# Patient Record
Sex: FEMALE | Race: BLACK OR AFRICAN AMERICAN | Employment: FULL TIME | URBAN - METROPOLITAN AREA
[De-identification: names, ages, dates, MRNs, and addresses within clinical notes are randomized per-mention and may not be internally consistent; named-entity substitution may affect disease eponyms.]

---

## 2019-10-01 ENCOUNTER — NURSE TRIAGE (OUTPATIENT)
Dept: ADMINISTRATIVE | Facility: CLINIC | Age: 24
End: 2019-10-01

## 2019-10-01 NOTE — TELEPHONE ENCOUNTER
Reason for Disposition   Caller has already spoken with another triager and has no further questions.    Additional Information   Negative: Caller is angry or rude (e.g., hangs up, verbally abusive, yelling)   Negative: Caller hangs up   Negative: Caller has already spoken with the PCP and has no further questions.    Protocols used: NO CONTACT OR DUPLICATE CONTACT CALL-A-AH

## 2019-10-01 NOTE — TELEPHONE ENCOUNTER
"    Reason for Disposition   Earache also present    Additional Information   Negative: Severe difficulty breathing (e.g., struggling for each breath, speaks in single words, stridor)   Negative: Sounds like a life-threatening emergency to the triager   Negative: [1] Diagnosed strep throat AND [2] taking antibiotic AND [3] symptoms continue   Negative: Throat culture results, call about   Negative: Productive cough is main symptom   Negative: Non-productive cough is main symptom   Negative: Hoarseness is main symptom   Negative: Runny nose is main symptom   Negative: [1] Drooling or spitting out saliva (because can't swallow) AND [2] normal breathing   Negative: Unable to open mouth completely   Negative: [1] Difficulty breathing AND [2] not severe   Negative: Fever > 104 F (40 C)   Negative: [1] Refuses to drink anything AND [2] for > 12 hours   Negative: [1] Drinking very little AND [2] dehydration suspected (e.g., no urine > 12 hours, very dry mouth, very lightheaded)   Negative: Patient sounds very sick or weak to the triager   Negative: SEVERE (e.g., excruciating) throat pain   Negative: [1] Pus on tonsils (back of throat) AND [2]  fever AND [3] swollen neck lymph nodes ("glands")   Negative: [1] Rash AND [2] widespread (especially chest and abdomen)    Protocols used: SORE THROAT-A-AH      "

## 2019-10-02 ENCOUNTER — OFFICE VISIT (OUTPATIENT)
Dept: INTERNAL MEDICINE | Facility: CLINIC | Age: 24
End: 2019-10-02
Payer: COMMERCIAL

## 2019-10-02 VITALS
TEMPERATURE: 100 F | HEIGHT: 60 IN | SYSTOLIC BLOOD PRESSURE: 106 MMHG | HEART RATE: 102 BPM | DIASTOLIC BLOOD PRESSURE: 70 MMHG | OXYGEN SATURATION: 99 % | BODY MASS INDEX: 24.11 KG/M2 | WEIGHT: 122.81 LBS

## 2019-10-02 DIAGNOSIS — J35.8 TONSIL STONE: ICD-10-CM

## 2019-10-02 DIAGNOSIS — J06.9 URTI (ACUTE UPPER RESPIRATORY INFECTION): Primary | ICD-10-CM

## 2019-10-02 DIAGNOSIS — D50.9 IRON DEFICIENCY ANEMIA, UNSPECIFIED IRON DEFICIENCY ANEMIA TYPE: ICD-10-CM

## 2019-10-02 DIAGNOSIS — L30.9 ECZEMA, UNSPECIFIED TYPE: ICD-10-CM

## 2019-10-02 PROCEDURE — 99999 PR PBB SHADOW E&M-EST. PATIENT-LVL V: CPT | Mod: PBBFAC,,, | Performed by: STUDENT IN AN ORGANIZED HEALTH CARE EDUCATION/TRAINING PROGRAM

## 2019-10-02 PROCEDURE — 99213 OFFICE O/P EST LOW 20 MIN: CPT | Mod: S$GLB,,, | Performed by: STUDENT IN AN ORGANIZED HEALTH CARE EDUCATION/TRAINING PROGRAM

## 2019-10-02 PROCEDURE — 3008F PR BODY MASS INDEX (BMI) DOCUMENTED: ICD-10-PCS | Mod: CPTII,S$GLB,, | Performed by: STUDENT IN AN ORGANIZED HEALTH CARE EDUCATION/TRAINING PROGRAM

## 2019-10-02 PROCEDURE — 3008F BODY MASS INDEX DOCD: CPT | Mod: CPTII,S$GLB,, | Performed by: STUDENT IN AN ORGANIZED HEALTH CARE EDUCATION/TRAINING PROGRAM

## 2019-10-02 PROCEDURE — 99999 PR PBB SHADOW E&M-EST. PATIENT-LVL V: ICD-10-PCS | Mod: PBBFAC,,, | Performed by: STUDENT IN AN ORGANIZED HEALTH CARE EDUCATION/TRAINING PROGRAM

## 2019-10-02 PROCEDURE — 99213 PR OFFICE/OUTPT VISIT, EST, LEVL III, 20-29 MIN: ICD-10-PCS | Mod: S$GLB,,, | Performed by: STUDENT IN AN ORGANIZED HEALTH CARE EDUCATION/TRAINING PROGRAM

## 2019-10-02 RX ORDER — FLUTICASONE PROPIONATE 50 MCG
1 SPRAY, SUSPENSION (ML) NASAL DAILY
Qty: 16 G | Refills: 0 | Status: SHIPPED | OUTPATIENT
Start: 2019-10-02

## 2019-10-02 RX ORDER — CETIRIZINE HYDROCHLORIDE 10 MG/1
10 TABLET ORAL DAILY
Refills: 0 | COMMUNITY
Start: 2019-10-02 | End: 2020-10-01

## 2019-10-02 NOTE — PATIENT INSTRUCTIONS
For your symptoms (sinusitis, congestion, cough):    1. Saline nasal spray (Oceans) or nasal rinse (NeilMed) at least 2-3 times/day    2. Flonase (fluticasone) or other steroid nasal spray - twice a day for 1 week, then once a day thereafter    3. Claritin (loratadine), Zyrtec (cetirizine), or Allegra (fexofenadine) once a day    4. Mucinex (guaifenesin) every 8-12 hours with plenty of water. If you are having a cough, you can use Mucinex-DM (guaifenesin-dextromethorphan).     5. Hot soup, hot tea with honey and lemon.    6. Plenty of sleep!      Viral Upper Respiratory Illness (Adult)  You have a viral upper respiratory illness (URI), which is another term for the common cold. This illness is contagious during the first few days. It is spread through the air by coughing and sneezing. It may also be spread by direct contact (touching the sick person and then touching your own eyes, nose, or mouth). Frequent handwashing will decrease risk of spread. Most viral illnesses go away within 7 to 10 days with rest and simple home remedies. Sometimes the illness may last for several weeks. Antibiotics will not kill a virus, and they are generally not prescribed for this condition.    Home care  · If symptoms are severe, rest at home for the first 2 to 3 days. When you resume activity, don't let yourself get too tired.  · Avoid being exposed to cigarette smoke (yours or others).  · You may use acetaminophen or ibuprofen to control pain and fever, unless another medicine was prescribed. (Note: If you have chronic liver or kidney disease, have ever had a stomach ulcer or gastrointestinal bleeding, or are taking blood-thinning medicines, talk with your healthcare provider before using these medicines.) Aspirin should never be given to anyone under 18 years of age who is ill with a viral infection or fever. It may cause severe liver or brain damage.  · Your appetite may be poor, so a light diet is fine. Avoid dehydration by  drinking 6 to 8 glasses of fluids per day (water, soft drinks, juices, tea, or soup). Extra fluids will help loosen secretions in the nose and lungs.  · Over-the-counter cold medicines will not shorten the length of time youre sick, but they may be helpful for the following symptoms: cough, sore throat, and nasal and sinus congestion. (Note: Do not use decongestants if you have high blood pressure.)  Follow-up care  Follow up with your healthcare provider, or as advised.  When to seek medical advice  Call your healthcare provider right away if any of these occur:  · Cough with lots of colored sputum (mucus)  · Severe headache; face, neck, or ear pain  · Difficulty swallowing due to throat pain  · Fever of 100.4°F (38°C)  Call 911, or get immediate medical care  Call emergency services right away if any of these occur:  · Chest pain, shortness of breath, wheezing, or difficulty breathing  · Coughing up blood  · Inability to swallow due to throat pain  Date Last Reviewed: 9/13/2015  © 6154-8916 [employer]. [address] [place] [address], [place] [address]. All rights reserved. This information is not intended as a substitute for professional medical care. Always follow your healthcare professional's instructions.

## 2019-10-02 NOTE — PROGRESS NOTES
Subjective:       Patient ID: Missy Martinez is a 24 y.o. female.    Chief Complaint: Establish Care; Sore Throat; and Headache    HPI     This is a 25 y/o female with hx of HEATH, tonsillar stones and eczema presents to urgent care for sore throat and cough for 3 days.    She reports hx of tonsillar stones that been evaluated by ENT at [facility] where they advised surgery but she declined at that time due to school commitment. She gets them very often every months or so and usually comes out or as she said pop it out.    Last 09/30 she got out few stones from her tonsils- after that been feeling subjective fever, sore throat, sinus and ear pressure- post nasal drip and dry cough.     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    She also complained of thinned hair in one spot on her scalp- but no loss (on exam shows her thinned her but no alopecia).    Review of Systems   Constitutional: Positive for activity change. Negative for appetite change, chills and fever.   HENT: Positive for postnasal drip, sinus pressure and sore throat. Negative for congestion and rhinorrhea.    Eyes: Negative for pain and itching.   Respiratory: Positive for cough (dry). Negative for chest tightness and shortness of breath.    Cardiovascular: Negative for palpitations and leg swelling.   Gastrointestinal: Negative for abdominal pain, diarrhea and nausea.   Endocrine: Negative for polyphagia and polyuria.   Genitourinary: Negative for dysuria and frequency.   Musculoskeletal: Negative for back pain.   Neurological: Negative for numbness and headaches.   Psychiatric/Behavioral: Negative for agitation and behavioral problems.       Objective:       Vitals:    10/02/19 1426   BP: 106/70   BP Location: Left arm   Patient Position: Sitting   BP Method: Medium (Manual)   Pulse: 102   Temp: 99.5 °F (37.5 °C)   SpO2: 99%    Weight: 55.7 kg (122 lb 12.7 oz)   Height: 5' (1.524 m)     Physical Exam   Constitutional: She is oriented to person, place, and time. She appears well-developed.   HENT:   Head: Normocephalic.   Mouth/Throat: Posterior oropharyngeal erythema present.   Cardiovascular: Normal rate, regular rhythm and normal heart sounds.   No murmur heard.  Pulmonary/Chest: Effort normal and breath sounds normal.   Abdominal: Soft. Bowel sounds are normal. She exhibits no distension. There is no tenderness.   Musculoskeletal: Normal range of motion.   Neurological: She is alert and oriented to person, place, and time.   Psychiatric: She has a normal mood and affect. Her behavior is normal.       Assessment:       1. URTI (acute upper respiratory infection)    2. Iron deficiency anemia, unspecified iron deficiency anemia type    3. Eczema, unspecified type    4. Tonsil stone    5. Hair loss        Plan:         URTI:    1. Saline nasal spray ([facility]ceans) or nasal rinse (NeilMed) at least 2-3 times/day  2. Flonase (fluticasone) or other steroid nasal spray - twice a day for 1 week, then once a day thereafter  3. Zyrtec (cetirizine) once a day  4. Mucinex (guaifenesin) every 8-12 hours with plenty of water.    - Advised if symptoms worsened or did not improve by 10 days needs to seek medical care.      - HEATH- will get a CBC.    - Tonsillar stones- will refer back to ENT as she is able to do the surgery now.          Haresh Green MD  Internal Medicine  PGY-3

## 2019-10-02 NOTE — LETTER
October 2, 2019      [facility]  [address]  [facility] [address]  Phone: [phone]  Fax: [phone]       Patient: Missy Martinez   YOB: 1995  Date of Visit: 10/02/2019    To Whom It May Concern:    Fan Martinez  was at [facility] on 10/02/2019. She may return to work/school on 10/07/2019 with no restrictions. If you have any questions or concerns, or if I can be of further assistance, please do not hesitate to contact me.    Sincerely,    Haresh Green MD

## 2019-10-04 NOTE — PROGRESS NOTES
I have reviewed the notes, assessments, and/or procedures performed by Dr. Green, I concur with her documentation of Missy Martinez.

## 2019-10-06 ENCOUNTER — OFFICE VISIT (OUTPATIENT)
Dept: URGENT CARE | Facility: CLINIC | Age: 24
End: 2019-10-06
Payer: COMMERCIAL

## 2019-10-06 VITALS
OXYGEN SATURATION: 98 % | BODY MASS INDEX: 23.95 KG/M2 | DIASTOLIC BLOOD PRESSURE: 84 MMHG | HEART RATE: 92 BPM | HEIGHT: 60 IN | WEIGHT: 122 LBS | RESPIRATION RATE: 18 BRPM | SYSTOLIC BLOOD PRESSURE: 118 MMHG | TEMPERATURE: 98 F

## 2019-10-06 DIAGNOSIS — J02.9 SORE THROAT: ICD-10-CM

## 2019-10-06 DIAGNOSIS — J03.80 ACUTE BACTERIAL TONSILLITIS: Primary | ICD-10-CM

## 2019-10-06 DIAGNOSIS — B96.89 ACUTE BACTERIAL TONSILLITIS: Primary | ICD-10-CM

## 2019-10-06 LAB
CTP QC/QA: YES
S PYO RRNA THROAT QL PROBE: NEGATIVE

## 2019-10-06 PROCEDURE — 87880 STREP A ASSAY W/OPTIC: CPT | Mod: QW,S$GLB,, | Performed by: FAMILY MEDICINE

## 2019-10-06 PROCEDURE — 99214 OFFICE O/P EST MOD 30 MIN: CPT | Mod: S$GLB,,, | Performed by: FAMILY MEDICINE

## 2019-10-06 PROCEDURE — 3008F PR BODY MASS INDEX (BMI) DOCUMENTED: ICD-10-PCS | Mod: CPTII,S$GLB,, | Performed by: FAMILY MEDICINE

## 2019-10-06 PROCEDURE — 3008F BODY MASS INDEX DOCD: CPT | Mod: CPTII,S$GLB,, | Performed by: FAMILY MEDICINE

## 2019-10-06 PROCEDURE — 99214 PR OFFICE/OUTPT VISIT, EST, LEVL IV, 30-39 MIN: ICD-10-PCS | Mod: S$GLB,,, | Performed by: FAMILY MEDICINE

## 2019-10-06 PROCEDURE — 87880 POCT RAPID STREP A: ICD-10-PCS | Mod: QW,S$GLB,, | Performed by: FAMILY MEDICINE

## 2019-10-06 RX ORDER — AMOXICILLIN 875 MG/1
875 TABLET, FILM COATED ORAL EVERY 12 HOURS
Qty: 20 TABLET | Refills: 0 | Status: SHIPPED | OUTPATIENT
Start: 2019-10-06 | End: 2020-03-06

## 2019-10-06 RX ORDER — METHYLPREDNISOLONE 4 MG/1
TABLET ORAL
Qty: 1 PACKAGE | Refills: 0 | Status: SHIPPED | OUTPATIENT
Start: 2019-10-06 | End: 2020-03-06

## 2019-10-06 NOTE — PROGRESS NOTES
Subjective:       Patient ID: Missy Martinez is a 24 y.o. female.    Vitals:  height is 5' (1.524 m) and weight is 55.3 kg (122 lb). Her temperature is 97.7 °F (36.5 °C). Her blood pressure is 118/84 and her pulse is 92. Her respiration is 18 and oxygen saturation is 98%.     Chief Complaint: Sore Throat    24 yr old pleasant female with anemia, and no other significant medical history presents to urgent care c/o difficulty swallowing, sore throat. Pt states she been feeling ill since Tuesday. Pt was seen by an [facility] provider Wednesday and had a URI, and was given flonase and zyrtec. Pt states she feel like her throat is getting worst since then. No fever but feels warm.    Sore Throat    This is a new problem. The current episode started in the past 7 days (Tuesday ). The problem has been gradually worsening. The pain is worse on the right side. There has been no fever. Associated symptoms include coughing. Pertinent negatives include no congestion, ear pain, shortness of breath, stridor or vomiting. She has tried NSAIDs (Zyrtec and flonase ) for the symptoms. The treatment provided no relief.       Constitution: Negative for chills, sweating, fatigue and fever.   HENT: Positive for sinus pressure and sore throat. Negative for ear pain, congestion, sinus pain and voice change.    Neck: Negative for painful lymph nodes.   Eyes: Negative for eye redness.   Respiratory: Positive for cough and sputum production. Negative for chest tightness, bloody sputum, COPD, shortness of breath, stridor, wheezing and asthma.    Gastrointestinal: Negative for nausea and vomiting.   Musculoskeletal: Negative for muscle ache.   Skin: Negative for rash and erythema.   Allergic/Immunologic: Negative for seasonal allergies and asthma.   Hematologic/Lymphatic: Negative for swollen lymph nodes.       PMH/PSH/FH/SH/MED/ALLERGY reviewed    Objective:       Vitals:    10/06/19 1704   BP: 118/84   Pulse: 92   Resp: 18   Temp: 97.7 °F (36.5  °C)       Physical Exam   Constitutional: She is oriented to person, place, and time. She appears well-developed and well-nourished. No distress.   HENT:   Head: Normocephalic and atraumatic.   Right Ear: External ear normal.   Left Ear: External ear normal.   Nose: Nose normal.   Mouth/Throat: Oropharyngeal exudate, posterior oropharyngeal edema, posterior oropharyngeal erythema and tonsillar abscesses present.       Eyes: Pupils are equal, round, and reactive to light. Conjunctivae and EOM are normal. Right eye exhibits no discharge. Left eye exhibits no discharge. No scleral icterus.   Neck: Normal range of motion. Neck supple. No JVD present. No tracheal deviation present. No thyromegaly present.   Cardiovascular: Normal rate, regular rhythm, normal heart sounds and intact distal pulses. Exam reveals no gallop and no friction rub.   No murmur heard.  Pulmonary/Chest: Effort normal and breath sounds normal. No stridor. She has no wheezes. She has no rales. She exhibits no tenderness.   Abdominal: Soft. Bowel sounds are normal. She exhibits no distension and no mass. There is no tenderness. There is no rebound and no guarding. No hernia.   Musculoskeletal: Normal range of motion. She exhibits no edema or tenderness.   Lymphadenopathy:     She has no cervical adenopathy.   Neurological: She is alert and oriented to person, place, and time. She has normal reflexes. She displays normal reflexes. No cranial nerve deficit. She exhibits normal muscle tone. Coordination normal.   Skin: Skin is warm and dry. No rash noted. She is not diaphoretic. No erythema. No pallor.   Psychiatric: She has a normal mood and affect. Her behavior is normal. Judgment and thought content normal.       Assessment:       1. Acute bacterial tonsillitis    2. Sore throat        Plan:         Acute bacterial tonsillitis  -     methylPREDNISolone (MEDROL DOSEPACK) 4 mg tablet; use as directed  Dispense: 1 Package; Refill: 0  -     amoxicillin  (AMOXIL) 875 MG tablet; Take 1 tablet (875 mg total) by mouth every 12 (twelve) hours.  Dispense: 20 tablet; Refill: 0    Sore throat  -     POCT rapid strep A  -     methylPREDNISolone (MEDROL DOSEPACK) 4 mg tablet; use as directed  Dispense: 1 Package; Refill: 0  -     amoxicillin (AMOXIL) 875 MG tablet; Take 1 tablet (875 mg total) by mouth every 12 (twelve) hours.  Dispense: 20 tablet; Refill: 0       acute ryan tonsillitis  -Advised warm liquids, warm/salt water gargles, halls lozenges, avoid cold drinks/bonnie/ice cream  -rapid strep negative  -medrol dose pack and amoxil    Spent adequate time in obtaining history and explaining differentials    Follow up if symptoms worsen or fail to improve.

## 2019-10-06 NOTE — PATIENT INSTRUCTIONS

## 2019-10-24 ENCOUNTER — OFFICE VISIT (OUTPATIENT)
Dept: OTOLARYNGOLOGY | Facility: CLINIC | Age: 24
End: 2019-10-24
Payer: COMMERCIAL

## 2019-10-24 VITALS
HEART RATE: 71 BPM | SYSTOLIC BLOOD PRESSURE: 111 MMHG | BODY MASS INDEX: 23.77 KG/M2 | DIASTOLIC BLOOD PRESSURE: 76 MMHG | WEIGHT: 121.69 LBS

## 2019-10-24 DIAGNOSIS — J03.91 RECURRENT TONSILLITIS: Primary | ICD-10-CM

## 2019-10-24 PROCEDURE — 99202 OFFICE O/P NEW SF 15 MIN: CPT | Mod: S$GLB,,, | Performed by: OTOLARYNGOLOGY

## 2019-10-24 PROCEDURE — 99999 PR PBB SHADOW E&M-EST. PATIENT-LVL III: ICD-10-PCS | Mod: PBBFAC,,, | Performed by: OTOLARYNGOLOGY

## 2019-10-24 PROCEDURE — 3008F BODY MASS INDEX DOCD: CPT | Mod: CPTII,S$GLB,, | Performed by: OTOLARYNGOLOGY

## 2019-10-24 PROCEDURE — 99999 PR PBB SHADOW E&M-EST. PATIENT-LVL III: CPT | Mod: PBBFAC,,, | Performed by: OTOLARYNGOLOGY

## 2019-10-24 PROCEDURE — 3008F PR BODY MASS INDEX (BMI) DOCUMENTED: ICD-10-PCS | Mod: CPTII,S$GLB,, | Performed by: OTOLARYNGOLOGY

## 2019-10-24 PROCEDURE — 99202 PR OFFICE/OUTPT VISIT, NEW, LEVL II, 15-29 MIN: ICD-10-PCS | Mod: S$GLB,,, | Performed by: OTOLARYNGOLOGY

## 2019-10-24 NOTE — ASSESSMENT & PLAN NOTE
This patient has classic recurrent tonsillitis and meets numeric criteria for tonsillectomy.  We discussed that the patient has 2 fundamental options: Observation with continued and intermittent antibiotics versus tonsillectomy.  I described tonsillectomy as an outpatient procedure in which the tonsils are removed.  The patient would be treated with 2 weeks of antibiotics and pain medication.  I then discussed the risks, benefits, indications, and alternatives to tonsillectomy.  The risks were described to include, but not be limited to, infection, bleeding, scarring, failure to eliminate sore throats, and the need for additional procedures.  There is data that suggests that sore throats may occur less frequently or be less severe, although no warranties or guarantees were made or implied.  There is a risk of bleeding within the first 6 hours of surgery which returns between 5 and 14 days after surgery when the scabs resolve.  The patient was encouraged to drink lots of liquids, eat soft foods, and avoid sharp edged foods such as chips.  Time was allowed for questions, and all questions were answered to the patient's apparent satisfaction.      She would like to think about the options and will contact me for additional counseling or to schedule surgery.

## 2019-10-24 NOTE — PROGRESS NOTES
Chief Complaint   Patient presents with    recurrent tonsil issues       HPI   24 y.o. female presents for evaluation of recurrent tonsillitis.  She reports roughly 8 episodes in the last 2 years.  She reports she has missed work due to this problem.  Each episode has been addressed with antibiotics and steroids.    Review of Systems   Constitutional: Negative for fatigue and unexpected weight change.   HENT: Per HPI.  Eyes: Negative for visual disturbance.   Respiratory: Negative for shortness of breath, hemoptysis   Cardiovascular: Negative for chest pain and palpitations.   Musculoskeletal: Negative for decreased ROM, back pain.   Skin: Negative for rash, sunburn, itching.   Neurological: Negative for dizziness and seizures.   Hematological: Negative for adenopathy. Does not bruise/bleed easily.   Endocrine: Negative for rapid weight loss/weight gain, heat/cold intolerance.     Past Medical History   Patient Active Problem List   Diagnosis    Iron deficiency anemia    Eczema           Past Surgical History   Past Surgical History:   Procedure Laterality Date    APPENDECTOMY           Family History   Family History   Problem Relation Age of Onset    Diabetes Mother     Diabetes Father     Asthma Sister     Scoliosis Sister            Social History   .  Social History     Socioeconomic History    Marital status: Single     Spouse name: Not on file    Number of children: Not on file    Years of education: Not on file    Highest education level: Not on file   Occupational History    Not on file   Social Needs    Financial resource strain: Not on file    Food insecurity:     Worry: Not on file     Inability: Not on file    Transportation needs:     Medical: Not on file     Non-medical: Not on file   Tobacco Use    Smoking status: Never Smoker   Substance and Sexual Activity    Alcohol use: Not on file    Drug use: Not on file    Sexual activity: Not on file   Lifestyle    Physical activity:      Days per week: Not on file     Minutes per session: Not on file    Stress: Not on file   Relationships    Social connections:     Talks on phone: Not on file     Gets together: Not on file     Attends Voodoo service: Not on file     Active member of club or organization: Not on file     Attends meetings of clubs or organizations: Not on file     Relationship status: Not on file   Other Topics Concern    Not on file   Social History Narrative    Not on file         Allergies   Review of patient's allergies indicates:  No Known Allergies        Physical Exam     Vitals:    10/24/19 0921   BP: 111/76   Pulse: 71         Body mass index is 23.77 kg/m².      General: AOx3, NAD   Respiratory:  Symmetric chest rise, normal effort  Nose: No gross nasal septal deviation. Inferior Turbinates WNL bilaterally. No septal perforation. No masses/lesions.   Oral Cavity:  Oral Tongue mobile, no lesions noted. Hard Palate WNL. No buccal or FOM lesions.  Oropharynx:  No masses/lesions of the posterior pharyngeal wall. Tonsillar fossa without lesions. Soft palate without masses. Midline uvula.   Neck: No scars.  No cervical lymphadenopathy, thyromegaly or thyroid nodules.  Normal range of motion.    Face: House Brackmann I bilaterally.     Assessment/Plan  Problem List Items Addressed This Visit        ENT    Recurrent tonsillitis - Primary     This patient has classic recurrent tonsillitis and meets numeric criteria for tonsillectomy.  We discussed that the patient has 2 fundamental options: Observation with continued and intermittent antibiotics versus tonsillectomy.  I described tonsillectomy as an outpatient procedure in which the tonsils are removed.  The patient would be treated with 2 weeks of antibiotics and pain medication.  I then discussed the risks, benefits, indications, and alternatives to tonsillectomy.  The risks were described to include, but not be limited to, infection, bleeding, scarring, failure to eliminate  sore throats, and the need for additional procedures.  There is data that suggests that sore throats may occur less frequently or be less severe, although no warranties or guarantees were made or implied.  There is a risk of bleeding within the first 6 hours of surgery which returns between 5 and 14 days after surgery when the scabs resolve.  The patient was encouraged to drink lots of liquids, eat soft foods, and avoid sharp edged foods such as chips.  Time was allowed for questions, and all questions were answered to the patient's apparent satisfaction.      She would like to think about the options and will contact me for additional counseling or to schedule surgery.

## 2019-10-24 NOTE — LETTER
October 24, 2019      Haresh Green MD  [address]  [facility] [address]           [facility]  [address]  [facility] [address]  Phone: [phone]  Fax: [phone]          Patient: Missy Martinez   MR Number: [id number]   YOB: 1995   Date of Visit: 10/24/2019       Dear Dr. Haresh Green:    Thank you for referring Missy Martinez to me for evaluation. Attached you will find relevant portions of my assessment and plan of care.    If you have questions, please do not hesitate to call me. I look forward to following Missy Martinez along with you.    Sincerely,    Kalpesh Ordaz MD    Enclosure  CC:  No Recipients    If you would like to receive this communication electronically, please contact [email or web address] or [phone] to request more information on [employer] access.    For providers and/or their staff who would like to refer a patient to [facility], please contact us through our one-stop-shop provider referral line, [facility], at [phone].    If you feel you have received this communication in error or would no longer like to receive these types of communications, please e-mail [email or web address]

## 2020-03-06 ENCOUNTER — OFFICE VISIT (OUTPATIENT)
Dept: URGENT CARE | Facility: CLINIC | Age: 25
End: 2020-03-06
Payer: COMMERCIAL

## 2020-03-06 VITALS
HEART RATE: 82 BPM | RESPIRATION RATE: 18 BRPM | BODY MASS INDEX: 23.75 KG/M2 | OXYGEN SATURATION: 98 % | DIASTOLIC BLOOD PRESSURE: 73 MMHG | TEMPERATURE: 99 F | SYSTOLIC BLOOD PRESSURE: 114 MMHG | WEIGHT: 121 LBS | HEIGHT: 60 IN

## 2020-03-06 DIAGNOSIS — J03.80 ACUTE BACTERIAL TONSILLITIS: Primary | ICD-10-CM

## 2020-03-06 DIAGNOSIS — J02.9 SORE THROAT: ICD-10-CM

## 2020-03-06 DIAGNOSIS — B96.89 ACUTE BACTERIAL TONSILLITIS: Primary | ICD-10-CM

## 2020-03-06 LAB
CTP QC/QA: YES
CTP QC/QA: YES
FLUAV AG NPH QL: NEGATIVE
FLUBV AG NPH QL: NEGATIVE
MOLECULAR STREP A: NEGATIVE

## 2020-03-06 PROCEDURE — 87651 STREP A DNA AMP PROBE: CPT | Mod: QW,S$GLB,, | Performed by: PHYSICIAN ASSISTANT

## 2020-03-06 PROCEDURE — 99214 PR OFFICE/OUTPT VISIT, EST, LEVL IV, 30-39 MIN: ICD-10-PCS | Mod: 25,S$GLB,, | Performed by: PHYSICIAN ASSISTANT

## 2020-03-06 PROCEDURE — 87804 INFLUENZA ASSAY W/OPTIC: CPT | Mod: QW,S$GLB,, | Performed by: PHYSICIAN ASSISTANT

## 2020-03-06 PROCEDURE — 87804 POCT INFLUENZA A/B: ICD-10-PCS | Mod: QW,S$GLB,, | Performed by: PHYSICIAN ASSISTANT

## 2020-03-06 PROCEDURE — 87651 POCT STREP A MOLECULAR: ICD-10-PCS | Mod: QW,S$GLB,, | Performed by: PHYSICIAN ASSISTANT

## 2020-03-06 PROCEDURE — 99214 OFFICE O/P EST MOD 30 MIN: CPT | Mod: 25,S$GLB,, | Performed by: PHYSICIAN ASSISTANT

## 2020-03-06 RX ORDER — PREDNISONE 10 MG/1
10 TABLET ORAL DAILY
Qty: 4 TABLET | Refills: 0 | Status: SHIPPED | OUTPATIENT
Start: 2020-03-06 | End: 2020-03-10

## 2020-03-06 RX ORDER — AMOXICILLIN 500 MG/1
500 TABLET, FILM COATED ORAL EVERY 12 HOURS
Qty: 20 TABLET | Refills: 0 | Status: SHIPPED | OUTPATIENT
Start: 2020-03-06 | End: 2020-03-16

## 2020-03-07 NOTE — PROGRESS NOTES
Subjective:       Patient ID: Missy Martinez is a 24 y.o. female.    Vitals:  height is 5' (1.524 m) and weight is 54.9 kg (121 lb). Her temperature is 98.5 °F (36.9 °C). Her blood pressure is 114/73 and her pulse is 82. Her respiration is 18 and oxygen saturation is 98%.     Chief Complaint: Sore Throat    C/o sore throat onset yesterday with exudate. Denies cough or congestion or fever. Hx of recurrent bacterial tonsillitis. Requesting strep swab, requesting flu swab, requesting throat culture.    Sore Throat    This is a new problem. The current episode started yesterday. The problem has been unchanged. The pain is worse on the right side. There has been no fever. The pain is mild. Pertinent negatives include no congestion, coughing, ear pain, shortness of breath, stridor or vomiting. She has tried nothing for the symptoms.       Constitution: Negative for chills, sweating, fatigue and fever.   HENT: Positive for sore throat. Negative for ear pain, congestion, sinus pain, sinus pressure and voice change.    Neck: Negative for painful lymph nodes.   Eyes: Negative for eye redness.   Respiratory: Negative for chest tightness, cough, sputum production, bloody sputum, COPD, shortness of breath, stridor, wheezing and asthma.    Gastrointestinal: Negative for nausea and vomiting.   Musculoskeletal: Negative for muscle ache.   Skin: Negative for rash.   Allergic/Immunologic: Negative for seasonal allergies and asthma.   Hematologic/Lymphatic: Negative for swollen lymph nodes.       Objective:      Physical Exam   Constitutional: She is oriented to person, place, and time. She appears well-developed and well-nourished. She is cooperative.  Non-toxic appearance. She does not have a sickly appearance. She does not appear ill. No distress.   HENT:   Head: Normocephalic and atraumatic.   Right Ear: Hearing, tympanic membrane, external ear and ear canal normal.   Left Ear: Hearing, tympanic membrane, external ear and ear  canal normal.   Nose: Nose normal. No mucosal edema, rhinorrhea or nasal deformity. No epistaxis. Right sinus exhibits no maxillary sinus tenderness and no frontal sinus tenderness. Left sinus exhibits no maxillary sinus tenderness and no frontal sinus tenderness.   Mouth/Throat: Uvula is midline and mucous membranes are normal. No trismus in the jaw. Normal dentition. No uvula swelling. Posterior oropharyngeal erythema (mild) present. No oropharyngeal exudate, posterior oropharyngeal edema or tonsillar abscesses. Tonsils are 2+ on the right. Tonsils are 2+ on the left. Tonsillar exudate.   Eyes: Conjunctivae and lids are normal. No scleral icterus.   Neck: Trachea normal, full passive range of motion without pain and phonation normal. Neck supple. No neck rigidity. No edema and no erythema present.   Cardiovascular: Normal rate, regular rhythm, normal heart sounds, intact distal pulses and normal pulses.   Pulmonary/Chest: Effort normal and breath sounds normal. No respiratory distress.   Abdominal: Normal appearance.   Musculoskeletal: Normal range of motion. She exhibits no edema or deformity.   Lymphadenopathy:     She has cervical adenopathy.        Right cervical: Superficial cervical adenopathy present.        Left cervical: Superficial cervical adenopathy present.   Neurological: She is alert and oriented to person, place, and time. She exhibits normal muscle tone. Coordination normal.   Skin: Skin is warm, dry, intact, not diaphoretic and not pale.   Psychiatric: She has a normal mood and affect. Her speech is normal and behavior is normal. Judgment and thought content normal. Cognition and memory are normal.   Nursing note and vitals reviewed.    Results for orders placed or performed in visit on 03/06/20   POCT Strep A, Molecular   Result Value Ref Range    Molecular Strep A, POC Negative Negative     Acceptable Yes    POCT Influenza A/B   Result Value Ref Range    Rapid Influenza A Ag  Negative Negative    Rapid Influenza B Ag Negative Negative     Acceptable Yes            Assessment:       1. Acute bacterial tonsillitis    2. Sore throat        Plan:       Discussed viral etiology is possible. However, hx of recurrent bacterial tonsillitis.  Pt requesting throat culture. Will tx for strep    Labs reviewed, pertinent imaging reviewed, previous medical records, medical history, surgical history, social history, family history reviewed.      Acute bacterial tonsillitis  -     amoxicillin (AMOXIL) 500 MG Tab; Take 1 tablet (500 mg total) by mouth every 12 (twelve) hours. for 10 days  Dispense: 20 tablet; Refill: 0  -     predniSONE (DELTASONE) 10 MG tablet; Take 1 tablet (10 mg total) by mouth once daily. for 4 days  Dispense: 4 tablet; Refill: 0    Sore throat  -     POCT Strep A, Molecular  -     Culture, Throat  -     POCT Influenza A/B    I have explained all risks, benefits, side effects of receiving a steroid - including but not limited to jittery feeling, flushed feeling, heart rate increase, dimpling at site of injection, lowered immune system. Patient understands all associated risks and would like to still receive steroid.        Patient Instructions   Fluids  Rest  Please monitor fevers closely, alternate Motrin and Tylenol as needed for fevers  Increase fluids and rest  If unable to tolerate fluids or foods please go to the emergency department  Change toothbrush fafter 24 hours  Follow up with ent 1 wk    Please drink plenty of fluids.  Please get plenty of rest.  Please return here or go to the Emergency Department for any concerns or worsening of condition.  If you were given wait & see antibiotics, please wait 3-5 days before taking them, and only take them if your symptoms have worsened or not improved.  If you do begin taking the antibiotics, please take them to completion.  If you were prescribed antibiotics, please take them to completion.  If you were prescribed a  narcotic medication, do not drive or operate heavy equipment or machinery while taking these medications.  If you do not have Hypertension or any history of palpitations, it is ok to take over the counter Sudafed or Mucinex D or Allegra-D or Claritin-D or Zyrtec-D.  If you do take one of the above, it is ok to combine that with plain over the counter Mucinex or Allegra or Claritin or Zyrtec.  If for example you are taking Zyrtec -D, you can combine that with Mucinex, but not Mucinex-D.  If you are taking Mucinex-D, you can combine that with plain Allegra or Claritin or Zyrtec.   If you do have Hypertension or palpitations, it is safe to take Coricidin HBP for relief of sinus symptoms.  We recommend you take over the counter Flonase (Fluticasone) or another nasally inhaled steroid unless you are already taking one.  Nasal irrigation with a saline spray or Netti Pot like device per their directions is also recommended.  If not allergic, please take over the counter Tylenol (Acetaminophen) and/or Motrin (Ibuprofen) as directed for control of pain and/or fever.  Please follow up with your primary care doctor or specialist as needed.    If you  smoke, please stop smoking.          Pharyngitis: Strep (Presumed)    You have pharyngitis (sore throat). The cause is thought to be the streptococcus, or strep, bacterium. Strep throat infection can cause throat pain that is worse when swallowing, aching all over, headache, and fever. The infection may be spread by coughing, kissing, or touching others after touching your mouth or nose. Antibiotic medications are given to treat the infection.  Home care  · Rest at home. Drink plenty of fluids to avoid dehydration.  · No work or school for the first 2 days of taking the antibiotics. After this time, you will not be contagious. You can then return to work or school if you are feeling better.   · The antibiotic medication must be taken for the full 10 days, even if you feel better.  This is very important to ensure the infection is treated. It is also important to prevent drug-resistant organisms from developing. If you were given an antibiotic shot, no more antibiotics are needed.  · You may use acetaminophen or ibuprofen to control pain or fever, unless another medicine was prescribed for this. If you have chronic liver or kidney disease or ever had a stomach ulcer or GI bleeding, talk with your doctor before using these medicines.  · Throat lozenges or a throat-numbing sprays can help reduce throat pain. Gargling with warm salt water can also help. Dissolve 1/2 teaspoon of salt in 1 8 ounce glass of warm water.   · Avoid salty or spicy foods, which can irritate the throat.  Follow-up care  Follow up with your healthcare provider or our staff if you are not improving over the next week.  When to seek medical advice  Call your healthcare provider right away if any of these occur:  · Fever as directed by your doctor.   · New or worsening ear pain, sinus pain, or headache  · Painful lumps in the back of neck  · Stiff neck  · Lymph nodes are getting larger  · Inability to swallow liquids, excessive drooling, or inability to open mouth wide due to throat pain  · Signs of dehydration (very dark urine or no urine, sunken eyes, dizziness)  · Trouble breathing or noisy breathing  · Muffled voice  · New rash  Date Last Reviewed: 4/13/2015  © 7555-3395 [employer]. [address] [place] [address], [place] [address]. All rights reserved. This information is not intended as a substitute for professional medical care. Always follow your healthcare professional's instructions.

## 2020-03-07 NOTE — PATIENT INSTRUCTIONS
Fluids  Rest  Please monitor fevers closely, alternate Motrin and Tylenol as needed for fevers  Increase fluids and rest  If unable to tolerate fluids or foods please go to the emergency department  Change toothbrush fawildaer 24 hours  Follow up with ent 1 wk    Please drink plenty of fluids.  Please get plenty of rest.  Please return here or go to the Emergency Department for any concerns or worsening of condition.  If you were given wait & see antibiotics, please wait 3-5 days before taking them, and only take them if your symptoms have worsened or not improved.  If you do begin taking the antibiotics, please take them to completion.  If you were prescribed antibiotics, please take them to completion.  If you were prescribed a narcotic medication, do not drive or operate heavy equipment or machinery while taking these medications.  If you do not have Hypertension or any history of palpitations, it is ok to take over the counter Sudafed or Mucinex D or Allegra-D or Claritin-D or Zyrtec-D.  If you do take one of the above, it is ok to combine that with plain over the counter Mucinex or Allegra or Claritin or Zyrtec.  If for example you are taking Zyrtec -D, you can combine that with Mucinex, but not Mucinex-D.  If you are taking Mucinex-D, you can combine that with plain Allegra or Claritin or Zyrtec.   If you do have Hypertension or palpitations, it is safe to take Coricidin HBP for relief of sinus symptoms.  We recommend you take over the counter Flonase (Fluticasone) or another nasally inhaled steroid unless you are already taking one.  Nasal irrigation with a saline spray or Netti Pot like device per their directions is also recommended.  If not allergic, please take over the counter Tylenol (Acetaminophen) and/or Motrin (Ibuprofen) as directed for control of pain and/or fever.  Please follow up with your primary care doctor or specialist as needed.    If you  smoke, please stop smoking.          Pharyngitis:  Strep (Presumed)    You have pharyngitis (sore throat). The cause is thought to be the streptococcus, or strep, bacterium. Strep throat infection can cause throat pain that is worse when swallowing, aching all over, headache, and fever. The infection may be spread by coughing, kissing, or touching others after touching your mouth or nose. Antibiotic medications are given to treat the infection.  Home care  · Rest at home. Drink plenty of fluids to avoid dehydration.  · No work or school for the first 2 days of taking the antibiotics. After this time, you will not be contagious. You can then return to work or school if you are feeling better.   · The antibiotic medication must be taken for the full 10 days, even if you feel better. This is very important to ensure the infection is treated. It is also important to prevent drug-resistant organisms from developing. If you were given an antibiotic shot, no more antibiotics are needed.  · You may use acetaminophen or ibuprofen to control pain or fever, unless another medicine was prescribed for this. If you have chronic liver or kidney disease or ever had a stomach ulcer or GI bleeding, talk with your doctor before using these medicines.  · Throat lozenges or a throat-numbing sprays can help reduce throat pain. Gargling with warm salt water can also help. Dissolve 1/2 teaspoon of salt in 1 8 ounce glass of warm water.   · Avoid salty or spicy foods, which can irritate the throat.  Follow-up care  Follow up with your healthcare provider or our staff if you are not improving over the next week.  When to seek medical advice  Call your healthcare provider right away if any of these occur:  · Fever as directed by your doctor.   · New or worsening ear pain, sinus pain, or headache  · Painful lumps in the back of neck  · Stiff neck  · Lymph nodes are getting larger  · Inability to swallow liquids, excessive drooling, or inability to open mouth wide due to throat pain  · Signs of  dehydration (very dark urine or no urine, sunken eyes, dizziness)  · Trouble breathing or noisy breathing  · Muffled voice  · New rash  Date Last Reviewed: 4/13/2015  © 8216-6665 [employer]. [address] [place] [address], [place] [address]. All rights reserved. This information is not intended as a substitute for professional medical care. Always follow your healthcare professional's instructions.

## 2020-03-10 ENCOUNTER — TELEPHONE (OUTPATIENT)
Dept: URGENT CARE | Facility: CLINIC | Age: 25
End: 2020-03-10

## 2020-03-10 NOTE — TELEPHONE ENCOUNTER
I received a call from lab, the patient's throat culture was obtained on  media.  Not able to run culture.  Called patient to inform her, no answer, left a voicemail with call back number.  If she is feeling better, no need to culture.  If she would still like to culture, she can return for nurse visit for re-swab.

## 2020-03-19 ENCOUNTER — NURSE TRIAGE (OUTPATIENT)
Dept: ADMINISTRATIVE | Facility: CLINIC | Age: 25
End: 2020-03-19

## 2020-03-19 ENCOUNTER — OFFICE VISIT (OUTPATIENT)
Dept: PRIMARY CARE CLINIC | Facility: CLINIC | Age: 25
End: 2020-03-19
Payer: COMMERCIAL

## 2020-03-19 VITALS — TEMPERATURE: 98 F

## 2020-03-19 DIAGNOSIS — N89.8 VAGINAL DISCHARGE: Primary | ICD-10-CM

## 2020-03-19 PROCEDURE — 99212 PR OFFICE/OUTPT VISIT, EST, LEVL II, 10-19 MIN: ICD-10-PCS | Mod: S$GLB,,, | Performed by: INTERNAL MEDICINE

## 2020-03-19 PROCEDURE — 99999 PR PBB SHADOW E&M-EST. PATIENT-LVL II: CPT | Mod: PBBFAC,,, | Performed by: INTERNAL MEDICINE

## 2020-03-19 PROCEDURE — 99212 OFFICE O/P EST SF 10 MIN: CPT | Mod: S$GLB,,, | Performed by: INTERNAL MEDICINE

## 2020-03-19 PROCEDURE — 99999 PR PBB SHADOW E&M-EST. PATIENT-LVL II: ICD-10-PCS | Mod: PBBFAC,,, | Performed by: INTERNAL MEDICINE

## 2020-03-19 RX ORDER — FLUCONAZOLE 150 MG/1
150 TABLET ORAL DAILY
Qty: 1 TABLET | Refills: 0 | Status: SHIPPED | OUTPATIENT
Start: 2020-03-19 | End: 2020-03-20

## 2020-03-19 NOTE — TELEPHONE ENCOUNTER
Pt reports having yeast infection and wants to know choices  Advised she could use [facility]  Call her PCP if she has one  Urgent care or ER    Reason for Disposition   General information question, no triage required and triager able to answer question    Protocols used: INFORMATION ONLY CALL-A-AH

## 2020-03-19 NOTE — PROGRESS NOTES
[facility] Note    Chief Complaint    No chief complaint on file.      History of Present Illness      Missy Martinez is a 24 y.o. AAF  presents with c/o Vaginal Discharge/itching/irritation.  Note she has had a recent COVID 19 exposure but is asymptomatic.     Vaginal Discharge - Pt on Abx for Tonsillitis recently.  She c/o White discharge that is not foul smelling.  She c/o Itching.  Will Rx Fluconazole.  If no improvement she will fu with her OB.     COVID - 19 Exposure - Pt reports he received an email 3/7/20 she was exposed to semeone with COVID -19.  Pt is asymptomatic.  If she develops Sx's she is aware she would require testing.       Past Medical History:  No past medical history on file.    Past Surgical History:   has a past surgical history that includes Appendectomy.    Family History:  family history includes Asthma in her sister; Diabetes in her father and mother; Scoliosis in her sister.     Social History:  Social History     Tobacco Use    Smoking status: Never Smoker   Substance Use Topics    Alcohol use: Not on file    Drug use: Not on file       ROS     Medications:  Outpatient Encounter Medications as of 3/19/2020   Medication Sig Dispense Refill    cetirizine (ZYRTEC) 10 MG tablet Take 1 tablet (10 mg total) by mouth once daily. (Patient not taking: Reported on 3/6/2020)  0    fluconazole (DIFLUCAN) 150 MG Tab Take 1 tablet (150 mg total) by mouth once daily. for 1 day 1 tablet 0    fluticasone propionate (FLONASE) 50 mcg/actuation nasal spray 1 spray (50 mcg total) by Each Nostril route once daily. (Patient not taking: Reported on 3/6/2020) 16 g 0     No facility-administered encounter medications on file as of 3/19/2020.        Current Outpatient Medications:     cetirizine (ZYRTEC) 10 MG tablet, Take 1 tablet (10 mg total) by mouth once daily. (Patient not taking: Reported on 3/6/2020), Disp: , Rfl: 0    fluconazole (DIFLUCAN) 150 MG Tab, Take 1 tablet (150 mg  total) by mouth once daily. for 1 day, Disp: 1 tablet, Rfl: 0    fluticasone propionate (FLONASE) 50 mcg/actuation nasal spray, 1 spray (50 mcg total) by Each Nostril route once daily. (Patient not taking: Reported on 3/6/2020), Disp: 16 g, Rfl: 0     Allergies:  Review of patient's allergies indicates:  No Known Allergies    Health Maintenance:    There is no immunization history on file for this patient.   Health Maintenance   Topic Date Due    Lipid Panel  1996    HPV Vaccines (1 - Female 3-dose series) 04/23/2011    TETANUS VACCINE  04/23/2014    Pap Smear  04/23/2017      Objective:      Vital Signs  Temp: 98.4 °F (36.9 °C)]    Laboratory:  CBC:  Recent Labs   Lab 10/02/19  1538   WBC 7.39   RBC 4.27   Hemoglobin 12.0   Hematocrit 37.8   Platelets 198   Mean Corpuscular Volume 89   Mean Corpuscular Hemoglobin 28.1   Mean Corpuscular Hemoglobin Conc 31.7 L         Physical Exam   Constitutional: She appears well-developed and well-nourished. No distress.   Eyes: Conjunctivae are normal.   Skin: She is not diaphoretic.   Psychiatric: She has a normal mood and affect.   Vitals reviewed.          Assessment:       1. Vaginal discharge        Missy Martinez is a 24 y.o.female with:    1. Vaginal discharge  - fluconazole (DIFLUCAN) 150 MG Tab; Take 1 tablet (150 mg total) by mouth once daily. for 1 day  Dispense: 1 tablet; Refill: 0  -Rx fluconazole 150 mg pox 1     Chronic conditions status updated as per HPI.  Other than changes above, cont current medications and maintain follow up with specialists.  Return to clinic prn.    Cristin Aldana MD  [facility]

## 2020-05-09 ENCOUNTER — NURSE TRIAGE (OUTPATIENT)
Dept: ADMINISTRATIVE | Facility: CLINIC | Age: 25
End: 2020-05-09

## 2020-05-09 RX ORDER — MULTIVITAMIN
1 TABLET ORAL DAILY
COMMUNITY

## 2020-05-09 RX ORDER — CLINDAMYCIN HYDROCHLORIDE 150 MG/1
150 CAPSULE ORAL EVERY 6 HOURS
COMMUNITY
Start: 2020-05-04 | End: 2020-05-09

## 2020-05-09 RX ORDER — IBUPROFEN 800 MG/1
800 TABLET ORAL 3 TIMES DAILY
COMMUNITY

## 2020-05-09 RX ORDER — ACETAMINOPHEN 500 MG
500 TABLET ORAL EVERY 6 HOURS PRN
COMMUNITY

## 2020-05-10 NOTE — TELEPHONE ENCOUNTER
Back pain since this morning, started this morning a dull. Now 8-9/10. Cut on left hand, pointer size of grain of rice when opening can of mushroom soup. Had a root canal done on Tuesday 5/5/2020.   Last tetanus before college, by age 19. Localized to the right side above the hip. Normal bm this evening. Last female exam 3 years. Not sexually active since 2/2020 last period 4/25/2020 and it comes every 27 days. Scoliosis. Appendix removed age 14. Exercising on high intensity training.       Reason for Disposition   [1] SEVERE pain (e.g., excruciating, scale 8-10) AND [2] present > 1 hour    Additional Information   Negative: Passed out (i.e., lost consciousness, collapsed and was not responding)   Negative: Shock suspected (e.g., cold/pale/clammy skin, too weak to stand, low BP, rapid pulse)   Negative: Difficult to awaken or acting confused (e.g., disoriented, slurred speech)   Negative: Sounds like a life-threatening emergency to the triager    Protocols used: FLANK PAIN-A-AH    Pain 8-9/10 radiating from right lower back to the front of the right lower abdomen. Had appendectomy at age 19. Not on birth control, but not sexually active for 3 months. Finishing up course of clindamycin for root canal done on 5/5/2020. No change in urination. She started new high intensity (HIT) exercises this week. Urination and bowel movements normal without blood in either. Patient states she drinks a lot of Pedialyte and water and has normal frequent urination. Advised the patient that she needs to go to the ED for evaluation to be evaluated and that she should mention the new exercise regimen. As I was giving her the disposition for ED, she asked me to hold because her dentist's office was calling.     Called the patient back and she states her dentist told her the same thing. She is shelter in place with her family in [place].

## 2020-07-12 ENCOUNTER — NURSE TRIAGE (OUTPATIENT)
Dept: ADMINISTRATIVE | Facility: CLINIC | Age: 25
End: 2020-07-12

## 2020-07-13 NOTE — TELEPHONE ENCOUNTER
Pt already spoke to another nurse and has no further questions.     Reason for Disposition   Caller has already spoken with another triager and has no further questions.    Additional Information   Negative: Caller is angry or rude (e.g., hangs up, verbally abusive, yelling)   Negative: Caller hangs up   Negative: Caller has already spoken with the PCP and has no further questions.    Protocols used: NO CONTACT OR DUPLICATE CONTACT CALL-A-AH
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
## 2020-07-16 ENCOUNTER — OFFICE VISIT (OUTPATIENT)
Dept: OBSTETRICS AND GYNECOLOGY | Facility: CLINIC | Age: 25
End: 2020-07-16
Payer: COMMERCIAL

## 2020-07-16 ENCOUNTER — NURSE TRIAGE (OUTPATIENT)
Dept: ADMINISTRATIVE | Facility: CLINIC | Age: 25
End: 2020-07-16

## 2020-07-16 ENCOUNTER — LAB VISIT (OUTPATIENT)
Dept: LAB | Facility: HOSPITAL | Age: 25
End: 2020-07-16
Attending: NURSE PRACTITIONER
Payer: COMMERCIAL

## 2020-07-16 VITALS
BODY MASS INDEX: 22.85 KG/M2 | SYSTOLIC BLOOD PRESSURE: 106 MMHG | HEIGHT: 60 IN | DIASTOLIC BLOOD PRESSURE: 70 MMHG | WEIGHT: 116.38 LBS

## 2020-07-16 DIAGNOSIS — Z11.3 SCREEN FOR STD (SEXUALLY TRANSMITTED DISEASE): ICD-10-CM

## 2020-07-16 DIAGNOSIS — B00.9 HSV (HERPES SIMPLEX VIRUS) INFECTION: Primary | ICD-10-CM

## 2020-07-16 PROCEDURE — 87480 CANDIDA DNA DIR PROBE: CPT

## 2020-07-16 PROCEDURE — 86592 SYPHILIS TEST NON-TREP QUAL: CPT

## 2020-07-16 PROCEDURE — 3008F PR BODY MASS INDEX (BMI) DOCUMENTED: ICD-10-PCS | Mod: CPTII,S$GLB,, | Performed by: NURSE PRACTITIONER

## 2020-07-16 PROCEDURE — 99203 PR OFFICE/OUTPT VISIT, NEW, LEVL III, 30-44 MIN: ICD-10-PCS | Mod: S$GLB,,, | Performed by: NURSE PRACTITIONER

## 2020-07-16 PROCEDURE — 87510 GARDNER VAG DNA DIR PROBE: CPT

## 2020-07-16 PROCEDURE — 86703 HIV-1/HIV-2 1 RESULT ANTBDY: CPT

## 2020-07-16 PROCEDURE — 87491 CHLMYD TRACH DNA AMP PROBE: CPT

## 2020-07-16 PROCEDURE — 80074 ACUTE HEPATITIS PANEL: CPT

## 2020-07-16 PROCEDURE — 3008F BODY MASS INDEX DOCD: CPT | Mod: CPTII,S$GLB,, | Performed by: NURSE PRACTITIONER

## 2020-07-16 PROCEDURE — 87529 HSV DNA AMP PROBE: CPT

## 2020-07-16 PROCEDURE — 99999 PR PBB SHADOW E&M-EST. PATIENT-LVL III: ICD-10-PCS | Mod: PBBFAC,,, | Performed by: NURSE PRACTITIONER

## 2020-07-16 PROCEDURE — 99999 PR PBB SHADOW E&M-EST. PATIENT-LVL III: CPT | Mod: PBBFAC,,, | Performed by: NURSE PRACTITIONER

## 2020-07-16 PROCEDURE — 99203 OFFICE O/P NEW LOW 30 MIN: CPT | Mod: S$GLB,,, | Performed by: NURSE PRACTITIONER

## 2020-07-16 PROCEDURE — 36415 COLL VENOUS BLD VENIPUNCTURE: CPT | Mod: PN

## 2020-07-16 RX ORDER — VALACYCLOVIR HYDROCHLORIDE 500 MG/1
500 TABLET, FILM COATED ORAL 2 TIMES DAILY
Qty: 6 TABLET | Refills: 3 | Status: SHIPPED | OUTPATIENT
Start: 2020-07-16 | End: 2020-07-19

## 2020-07-16 NOTE — TELEPHONE ENCOUNTER
Pt states she has genital herpes and is currently having outbreak. Pt states she would like same day appointment with OBGYN. Pt declines triage for complaint. Pt to call scheduling.     Reason for Disposition   Requesting regular office appointment    Additional Information   Negative: RN needs further essential information from caller in order to complete triage    Protocols used: INFORMATION ONLY CALL-A-AH

## 2020-07-17 LAB
HAV IGM SERPL QL IA: NEGATIVE
HBV CORE IGM SERPL QL IA: NEGATIVE
HBV SURFACE AG SERPL QL IA: NEGATIVE
HCV AB SERPL QL IA: NEGATIVE
HIV 1+2 AB+HIV1 P24 AG SERPL QL IA: NEGATIVE
RPR SER QL: NORMAL

## 2020-07-17 NOTE — PROGRESS NOTES
CC:  Vaginal lesions    HPI: Pt is a 25 y.o.  female who presents with complaints of sores to her vagina- onset 3 days ago.  Reports she first had some burning with urination then noticed the vaginal sores.  Reports associated pain, tingling and itching. H/o genital herpes but has not had an outbreak in several years. Pt has not been sexually active in a few months. Pt also desires STD screening- full panel.      History reviewed. No pertinent past medical history.    Past Surgical History:   Procedure Laterality Date    APPENDECTOMY       Current Outpatient Medications on File Prior to Visit   Medication Sig Dispense Refill    acetaminophen (TYLENOL) 500 MG tablet Take 500 mg by mouth every 6 (six) hours as needed.      cetirizine (ZYRTEC) 10 MG tablet Take 1 tablet (10 mg total) by mouth once daily.  0    fluticasone propionate (FLONASE) 50 mcg/actuation nasal spray 1 spray (50 mcg total) by Each Nostril route once daily. 16 g 0    ibuprofen (ADVIL,MOTRIN) 800 MG tablet Take 800 mg by mouth 3 (three) times daily.      multivitamin (THERAGRAN) per tablet Take 1 tablet by mouth once daily.       No current facility-administered medications on file prior to visit.      Review of patient's allergies indicates:   Allergen Reactions    Penicillins Itching and Swelling     Right eye itching and swollen      Vitals:    20 1344   BP: 106/70     OB History        1    Para        Term                AB   1    Living           SAB   1    TAB        Ectopic        Multiple        Live Births                   Social History     Socioeconomic History    Marital status: Single     Spouse name: Not on file    Number of children: Not on file    Years of education: Not on file    Highest education level: Not on file   Occupational History    Not on file   Social Needs    Financial resource strain: Not on file    Food insecurity     Worry: Not on file     Inability: Not on file    Transportation  needs     Medical: Not on file     Non-medical: Not on file   Tobacco Use    Smoking status: Never Smoker   Substance and Sexual Activity    Alcohol use: Not Currently    Drug use: Never    Sexual activity: Not Currently   Lifestyle    Physical activity     Days per week: Not on file     Minutes per session: Not on file    Stress: Not on file   Relationships    Social connections     Talks on phone: Not on file     Gets together: Not on file     Attends [employer] service: Not on file     Active member of club or organization: Not on file     Attends meetings of clubs or organizations: Not on file     Relationship status: Not on file   Other Topics Concern    Not on file   Social History Narrative    Not on file         ROS:  GENERAL: Feeling well overall. Denies fever or chills.   SKIN: Denies rash or lesions.   HEAD: Denies head injury or headache.   NODES: Denies enlarged lymph nodes.   CHEST: Denies chest pain or shortness of breath.   CARDIOVASCULAR: Denies palpitations or left sided chest pain.   ABDOMEN: No abdominal pain, constipation, diarrhea, nausea, vomiting or rectal bleeding.   URINARY: No dysuria, hematuria, or burning on urination.  REPRODUCTIVE: See HPI.   BREASTS: Denies pain, lumps, or nipple discharge.   HEMATOLOGIC: No easy bruisability or excessive bleeding.   MUSCULOSKELETAL: Denies joint pain or swelling.   NEUROLOGIC: Denies syncope or weakness.   PSYCHIATRIC: Denies depression, anxiety or mood swings.    PE:   APPEARANCE: Well nourished, well developed, female, in no acute distress.  INGUINAL: + palpable 2cm x 1 cm mobile soft inguinal lymph node  VULVA: + multiple ulcers to right labia majora. Normal external female genitalia.  URETHRAL MEATUS: Normal size and location, no lesions, no prolapse.  URETHRA: No masses, tenderness, or prolapse.  VAGINA: Moist. No lesions or lacerations noted. No abnormal discharge present. No odor present.   CERVIX: No lesions or discharge. No cervical  motion tenderness.   UTERUS: Normal size, regular shape, mobile, non-tender.  ADNEXA: No tenderness. No fullness or masses palpated in the adnexal regions.   ANUS PERINEUM: Normal.      Diagnoses and all orders for this visit:    HSV (herpes simplex virus) infection  -     valACYclovir (VALTREX) 500 MG tablet; Take 1 tablet (500 mg total) by mouth 2 (two) times daily. for 3 days  -     HSV by Rapid PCR, Non-Blood [facility]; Vagina; Future  -     HSV by Rapid PCR, Non-Blood [facility]; Vagina    Screen for STD (sexually transmitted disease)  -     C. trachomatis/N. gonorrhoeae by AMP DNA  -     HIV-1 and HIV-2 antibodies; Future  -     RPR; Future  -     Hepatitis panel, acute; Future  -     Vaginosis Screen by DNA Probe           Plan:    Discussed lesions are highly suspicious for genital herpes.  HSV PCR ordered. Rx sent for valtrex 500 mg BID x 3 days for episodic HSV treatment.     Discussed that clinical recurrences of genital HSV are common, but are typically less severe t hanprimary infections.   Discussed that recurrences are also more common in immunosuppressed patients.  Discussed triggers for recurrence -- Illness, stress, sunlight, and fatigue can trigger recurrent herpes outbreaks. menstrual periods may trigger an outbreak.  Discussed recommendations during pregnancy including starting daily suppressive therapy at 36 weeks gestation and need for speculum exam prior to labor to assess for active lesions.  Discussed if have active lesions at the time of delivery a  section is indicated.       Full STD panel done.     Release of information signed to obtain pt's prior records    Follow-up for annual exam or PRN    PHAM Reynolds

## 2020-07-19 LAB
HSV1 DNA SPEC QL NAA+PROBE: POSITIVE
HSV2 DNA SPEC QL NAA+PROBE: NEGATIVE
SPECIMEN SOURCE: ABNORMAL

## 2020-07-21 LAB
C TRACH DNA SPEC QL NAA+PROBE: NOT DETECTED
N GONORRHOEA DNA SPEC QL NAA+PROBE: NOT DETECTED

## 2020-07-22 ENCOUNTER — NURSE TRIAGE (OUTPATIENT)
Dept: ADMINISTRATIVE | Facility: CLINIC | Age: 25
End: 2020-07-22

## 2020-07-23 NOTE — TELEPHONE ENCOUNTER
Patient calling, asking if we have mental health services, states she took an online test and feels she is depressed, asking if line recorded, states her mother is coming tomorrow,has room mate but she is out of town, denies intent to harm herself, asking about if some one could commit you in the state of [place]. Reassured, listened to concerns, assisted in pulling up [facility], suggested employee assistance at her job, will check when she goes to work tomorrow. Patient recently had break up, sad. Several of her past physicians suggested to speak with, does not know them well enough, Triage to see PCP within the next 24 hours, advised to call [facility] and ask for same day appointment, Number given for KATHY, emphasized 24 hour line, also number for [facility], and [facility] crisis lines. Patient verb understanding, ED education done re any change or thoughts of harm to herself, verb understanding  Reason for Disposition   Symptoms interfere with work or school   Depression resources and referral numbers, questions about    Additional Information   Negative: Patient attempted suicide   Negative: Patient is threatening suicide now   Negative: Violent behavior, or threatening to physically hurt or kill someone   Negative: [1] Patient is very confused (disoriented, slurred speech) AND [2] no other adult (e.g., friend or family member) available   Negative: [1] Difficult to awaken or acting very confused (disoriented, slurred speech) AND [2] new onset   Negative: Sounds like a life-threatening emergency to the triager   Negative: Alcohol use, abuse or dependence, question or problem related to   Negative: Drug abuse or dependence, question or problem related to   Negative: Bipolar disorder (manic depression)   Negative: Depression during the postpartum period (< 1 year since delivery)   Negative: [1] Depression AND [2] unable to do any of normal activities (e.g., self care, school, work; in  comparison to baseline).   Negative: Very strange or confused behavior   Negative: Patient sounds very sick or weak to the triager   Negative: [1] Depression AND [2] worsening (e.g.,sleeping poorly, less able to do activities of daily living)   Negative: Sometimes has thoughts of suicide   Negative: Fever > 101 F (38.3 C)    Protocols used: DEPRESSION-A-AH

## 2020-07-24 LAB — T VAGINALIS RRNA GENITAL QL PROBE: NORMAL

## 2020-07-29 ENCOUNTER — TELEPHONE (OUTPATIENT)
Dept: OBSTETRICS AND GYNECOLOGY | Facility: CLINIC | Age: 25
End: 2020-07-29

## 2020-07-29 NOTE — TELEPHONE ENCOUNTER
----- Message from Torri Damico sent at 7/29/2020 12:43 PM CDT -----  Regarding: Test Results  Contact: Patient  Type:  Test Results    Who Called: Patient   Name of Test (Lab/Mammo/Etc): Lab   Date of Test: 07/16/2020  Ordering Provider: Roger   Where the test was performed: [facility]   Would the patient rather a call back or a response via [facility]?call back   Best Call Back Number:  [phone]  Additional Information:  n/a

## 2020-08-05 ENCOUNTER — HOSPITAL ENCOUNTER (OUTPATIENT)
Dept: RADIOLOGY | Facility: OTHER | Age: 25
Discharge: HOME | End: 2020-08-05
Attending: NURSE PRACTITIONER
Payer: COMMERCIAL

## 2020-08-05 ENCOUNTER — OFFICE VISIT (OUTPATIENT)
Dept: OBSTETRICS AND GYNECOLOGY | Facility: CLINIC | Age: 25
End: 2020-08-05
Payer: COMMERCIAL

## 2020-08-05 VITALS — SYSTOLIC BLOOD PRESSURE: 118 MMHG | WEIGHT: 112 LBS | DIASTOLIC BLOOD PRESSURE: 82 MMHG | BODY MASS INDEX: 21.87 KG/M2

## 2020-08-05 DIAGNOSIS — R10.2 PELVIC PAIN: Primary | ICD-10-CM

## 2020-08-05 DIAGNOSIS — Z11.3 SCREEN FOR STD (SEXUALLY TRANSMITTED DISEASE): ICD-10-CM

## 2020-08-05 DIAGNOSIS — R10.2 PELVIC PAIN: ICD-10-CM

## 2020-08-05 PROCEDURE — 87510 GARDNER VAG DNA DIR PROBE: CPT

## 2020-08-05 PROCEDURE — 99999 PR PBB SHADOW E&M-EST. PATIENT-LVL III: CPT | Mod: PBBFAC,,, | Performed by: NURSE PRACTITIONER

## 2020-08-05 PROCEDURE — 99213 OFFICE O/P EST LOW 20 MIN: CPT | Mod: S$GLB,,, | Performed by: NURSE PRACTITIONER

## 2020-08-05 PROCEDURE — 76830 TRANSVAGINAL US NON-OB: CPT | Mod: 26,,, | Performed by: RADIOLOGY

## 2020-08-05 PROCEDURE — 76856 US PELVIS COMP WITH TRANSVAG NON-OB (XPD): ICD-10-PCS | Mod: 26,,, | Performed by: RADIOLOGY

## 2020-08-05 PROCEDURE — 76830 TRANSVAGINAL US NON-OB: CPT | Mod: TC

## 2020-08-05 PROCEDURE — 76856 US EXAM PELVIC COMPLETE: CPT | Mod: 26,,, | Performed by: RADIOLOGY

## 2020-08-05 PROCEDURE — 76830 US PELVIS COMP WITH TRANSVAG NON-OB (XPD): ICD-10-PCS | Mod: 26,,, | Performed by: RADIOLOGY

## 2020-08-05 PROCEDURE — 99999 PR PBB SHADOW E&M-EST. PATIENT-LVL III: ICD-10-PCS | Mod: PBBFAC,,, | Performed by: NURSE PRACTITIONER

## 2020-08-05 PROCEDURE — 87480 CANDIDA DNA DIR PROBE: CPT

## 2020-08-05 PROCEDURE — 3008F PR BODY MASS INDEX (BMI) DOCUMENTED: ICD-10-PCS | Mod: CPTII,S$GLB,, | Performed by: NURSE PRACTITIONER

## 2020-08-05 PROCEDURE — 99213 PR OFFICE/OUTPT VISIT, EST, LEVL III, 20-29 MIN: ICD-10-PCS | Mod: S$GLB,,, | Performed by: NURSE PRACTITIONER

## 2020-08-05 PROCEDURE — 3008F BODY MASS INDEX DOCD: CPT | Mod: CPTII,S$GLB,, | Performed by: NURSE PRACTITIONER

## 2020-08-05 NOTE — PROGRESS NOTES
History & Physical  Gynecology      SUBJECTIVE:     Chief Complaint: Dysmenorrhea (very painful cycles )       History of Present Illness:  Abdominal Pain  25 year old  female presents for evaluation of pelvic pain. The pain is described as aching, burning, cramping, dull, sharp, shooting and stabbing, and is 8/10 in intensity. Pain is located in the deep pelvis area with radiation to the legs. Onset was sudden occurring this morning at 0430. Symptoms have been rapidly improving since then are now at a 1-2/10. Aggravating factors: menses-pt reports that she started her cycle today. Alleviating factors: midol. Associated symptoms: diarrhea and vomiting. The patient denies chills, dysuria, fever, headache, hematuria and sweats. Risk factors for pelvic/abdominal pain include prior ovarian cyst rupture.        Review of patient's allergies indicates:   Allergen Reactions    Penicillins Itching and Swelling     Right eye itching and swollen        History reviewed. No pertinent past medical history.  Past Surgical History:   Procedure Laterality Date    APPENDECTOMY       OB History        1    Para        Term                AB   1    Living           SAB   1    TAB        Ectopic        Multiple        Live Births                   Family History   Problem Relation Age of Onset    Diabetes Mother     Uterine cancer Mother     Diabetes Father     Asthma Sister     Scoliosis Sister     Cancer Maternal Uncle     Breast cancer Neg Hx     Colon cancer Neg Hx     Ovarian cancer Neg Hx      Social History     Tobacco Use    Smoking status: Never Smoker   Substance Use Topics    Alcohol use: Not Currently    Drug use: Never       Current Outpatient Medications   Medication Sig    multivitamin (THERAGRAN) per tablet Take 1 tablet by mouth once daily.    acetaminophen (TYLENOL) 500 MG tablet Take 500 mg by mouth every 6 (six) hours as needed.    cetirizine (ZYRTEC) 10 MG tablet Take 1 tablet  (10 mg total) by mouth once daily. (Patient not taking: Reported on 8/5/2020)    fluticasone propionate (FLONASE) 50 mcg/actuation nasal spray 1 spray (50 mcg total) by Each Nostril route once daily. (Patient not taking: Reported on 8/5/2020)    ibuprofen (ADVIL,MOTRIN) 800 MG tablet Take 800 mg by mouth 3 (three) times daily.    valACYclovir (VALTREX) 500 MG tablet Take 1 tablet (500 mg total) by mouth 2 (two) times daily. for 3 days     No current facility-administered medications for this visit.          Review of Systems:  Review of Systems   Constitutional: Negative for chills, fatigue and fever.   Eyes: Negative for visual disturbance.   Respiratory: Negative for shortness of breath.    Cardiovascular: Negative for leg swelling.   Gastrointestinal: Positive for diarrhea, nausea and vomiting. Negative for abdominal pain, bloating and constipation.   Endocrine: Negative for hair loss and hot flashes.   Genitourinary: Positive for dysmenorrhea, pelvic pain and vaginal bleeding. Negative for dysuria, flank pain, hematuria, menstrual problem, vaginal discharge, vaginal pain, vaginal dryness and vaginal odor.   Integumentary:  Negative for breast mass.   Neurological: Negative for seizures, syncope and headaches.   Psychiatric/Behavioral: Negative for depression.   Breast: Negative for asymmetry, lump and mass       OBJECTIVE:     Physical Exam:  Physical Exam  Vitals signs reviewed.   Constitutional:       Appearance: She is well-developed.   HENT:      Head: Normocephalic and atraumatic.   Eyes:      Pupils: Pupils are equal, round, and reactive to light.   Neck:      Musculoskeletal: Normal range of motion.   Pulmonary:      Effort: Pulmonary effort is normal.   Abdominal:      Palpations: Abdomen is soft.   Musculoskeletal: Normal range of motion.   Skin:     General: Skin is warm and dry.   Neurological:      Mental Status: She is alert and oriented to person, place, and time.   Psychiatric:          Behavior: Behavior normal.           ASSESSMENT:       ICD-10-CM ICD-9-CM    1. Pelvic pain  R10.2 [id number] US Pelvis Comp with Transvag NON-OB (xpd      CANCELED: US Pelvis Comp with Transvag NON-OB (xpd   2. Screen for STD (sexually transmitted disease)  Z11.3 V74.5 Vaginosis Screen by DNA Probe          Plan:      No acute abdomen on exam. Pt not in visible distress at this time. Discussed pain this morning could be due to ruptured ovarian cyst, especially since pt reports a history of this. Will get stat US to r/o other causes for severe pelvic pain including intermittent torsion of ovary. If US WNL and pain continues recommend f/u with GI vs pelvic pain specialist. Discussed birth control options to control ovarian cysts. Pt reports that she wants to start trying to conceive in 2 years and does not think she wants to get on birth control at this time (she is not currently sexually active). Pt also very concerned that she has endometriosis. Discussed symptomatology, surgical diagnosis, and treatment of endometriosis. Pt to f/u pending US results and if pain continues. Pain/ER precautions discussed.       Counseling time: 15 minutes    F/u PANFILO Duran

## 2020-08-06 LAB
CANDIDA RRNA VAG QL PROBE: NEGATIVE
G VAGINALIS RRNA GENITAL QL PROBE: NEGATIVE
T VAGINALIS RRNA GENITAL QL PROBE: NEGATIVE

## 2020-08-12 ENCOUNTER — TELEPHONE (OUTPATIENT)
Dept: OBSTETRICS AND GYNECOLOGY | Facility: CLINIC | Age: 25
End: 2020-08-12

## 2020-08-12 NOTE — TELEPHONE ENCOUNTER
Spoke with pt:  Pt was calling for results of vaginal swab on 8/5/2020 as well as US results:  Pt states she can not access her myChart for results.  Pt does not want help accessing her myChart, she would just like her results.    Per JOANN Duran's notes:  Your vaginosis swab was negative. Please let me know if you have any questions or concerns! Hope you are feeling better!        Your pelvic ultrasound was normal. This may be because you had a ruptured ovarian cyst as we discussed at the visit. Please let me know if you have any questions or concerns or if the pain returns or you develop any other concerning symptoms.      Pt verbalized understanding.

## 2020-08-12 NOTE — TELEPHONE ENCOUNTER
----- Message from Albert Castillo sent at 8/12/2020 12:49 PM CDT -----  Regarding: Results  Contact: CARLEEN FITZGERALD [[id number]]  Name of Who is Calling: CARLEEN FITZGERALD [[id number]]      What is the request in detail: Would like to speak with staff in regards to imaging and swab results can be email to her [email or web address]. Please advise      Can the clinic reply by [facility]: no (states it does not work)      What Number to [facility] Back [facility]: [phone] (leave a voicemail with results)

## 2020-09-13 ENCOUNTER — HOSPITAL ENCOUNTER (EMERGENCY)
Facility: OTHER | Age: 25
Discharge: HOME | End: 2020-09-13
Attending: EMERGENCY MEDICINE
Payer: COMMERCIAL

## 2020-09-13 ENCOUNTER — NURSE TRIAGE (OUTPATIENT)
Dept: ADMINISTRATIVE | Facility: CLINIC | Age: 25
End: 2020-09-13

## 2020-09-13 VITALS
HEIGHT: 60 IN | DIASTOLIC BLOOD PRESSURE: 64 MMHG | BODY MASS INDEX: 21.99 KG/M2 | RESPIRATION RATE: 18 BRPM | WEIGHT: 112 LBS | SYSTOLIC BLOOD PRESSURE: 117 MMHG | TEMPERATURE: 99 F | OXYGEN SATURATION: 99 % | HEART RATE: 85 BPM

## 2020-09-13 DIAGNOSIS — R10.31 RIGHT LOWER QUADRANT ABDOMINAL PAIN: ICD-10-CM

## 2020-09-13 DIAGNOSIS — H11.32 SUBCONJUNCTIVAL HEMORRHAGE OF LEFT EYE: ICD-10-CM

## 2020-09-13 DIAGNOSIS — R51.9 ACUTE NONINTRACTABLE HEADACHE, UNSPECIFIED HEADACHE TYPE: Primary | ICD-10-CM

## 2020-09-13 LAB
ALBUMIN SERPL BCP-MCNC: 4.3 G/DL (ref 3.5–5.2)
ALP SERPL-CCNC: 49 U/L (ref 55–135)
ALT SERPL W/O P-5'-P-CCNC: 12 U/L (ref 10–44)
ANION GAP SERPL CALC-SCNC: 8 MMOL/L (ref 8–16)
AST SERPL-CCNC: 31 U/L (ref 10–40)
B-HCG UR QL: NEGATIVE
BASOPHILS # BLD AUTO: 0.04 K/UL (ref 0–0.2)
BASOPHILS NFR BLD: 0.8 % (ref 0–1.9)
BILIRUB SERPL-MCNC: 1 MG/DL (ref 0.1–1)
BILIRUB UR QL STRIP: NEGATIVE
BUN SERPL-MCNC: 8 MG/DL (ref 6–20)
CALCIUM SERPL-MCNC: 9.4 MG/DL (ref 8.7–10.5)
CHLORIDE SERPL-SCNC: 104 MMOL/L (ref 95–110)
CLARITY UR: CLEAR
CO2 SERPL-SCNC: 24 MMOL/L (ref 23–29)
COLOR UR: YELLOW
CREAT SERPL-MCNC: 0.7 MG/DL (ref 0.5–1.4)
CTP QC/QA: YES
DIFFERENTIAL METHOD: ABNORMAL
EOSINOPHIL # BLD AUTO: 0 K/UL (ref 0–0.5)
EOSINOPHIL NFR BLD: 0.4 % (ref 0–8)
ERYTHROCYTE [DISTWIDTH] IN BLOOD BY AUTOMATED COUNT: 12.2 % (ref 11.5–14.5)
EST. GFR  (AFRICAN AMERICAN): >60 ML/MIN/1.73 M^2
EST. GFR  (NON AFRICAN AMERICAN): >60 ML/MIN/1.73 M^2
GLUCOSE SERPL-MCNC: 83 MG/DL (ref 70–110)
GLUCOSE UR QL STRIP: NEGATIVE
HCT VFR BLD AUTO: 38.5 % (ref 37–48.5)
HGB BLD-MCNC: 12 G/DL (ref 12–16)
HGB UR QL STRIP: NEGATIVE
IMM GRANULOCYTES # BLD AUTO: 0.01 K/UL (ref 0–0.04)
IMM GRANULOCYTES NFR BLD AUTO: 0.2 % (ref 0–0.5)
KETONES UR QL STRIP: NEGATIVE
LEUKOCYTE ESTERASE UR QL STRIP: NEGATIVE
LIPASE SERPL-CCNC: 57 U/L (ref 4–60)
LYMPHOCYTES # BLD AUTO: 2.1 K/UL (ref 1–4.8)
LYMPHOCYTES NFR BLD: 39.1 % (ref 18–48)
MCH RBC QN AUTO: 28.8 PG (ref 27–31)
MCHC RBC AUTO-ENTMCNC: 31.2 G/DL (ref 32–36)
MCV RBC AUTO: 93 FL (ref 82–98)
MONOCYTES # BLD AUTO: 0.4 K/UL (ref 0.3–1)
MONOCYTES NFR BLD: 7.6 % (ref 4–15)
NEUTROPHILS # BLD AUTO: 2.7 K/UL (ref 1.8–7.7)
NEUTROPHILS NFR BLD: 51.9 % (ref 38–73)
NITRITE UR QL STRIP: NEGATIVE
NRBC BLD-RTO: 0 /100 WBC
PH UR STRIP: 8 [PH] (ref 5–8)
PLATELET # BLD AUTO: 201 K/UL (ref 150–350)
PMV BLD AUTO: 10.8 FL (ref 9.2–12.9)
POTASSIUM SERPL-SCNC: 4.1 MMOL/L (ref 3.5–5.1)
PROT SERPL-MCNC: 7.2 G/DL (ref 6–8.4)
PROT UR QL STRIP: NEGATIVE
RBC # BLD AUTO: 4.16 M/UL (ref 4–5.4)
SODIUM SERPL-SCNC: 136 MMOL/L (ref 136–145)
SP GR UR STRIP: 1.01 (ref 1–1.03)
URN SPEC COLLECT METH UR: NORMAL
UROBILINOGEN UR STRIP-ACNC: NEGATIVE EU/DL
WBC # BLD AUTO: 5.24 K/UL (ref 3.9–12.7)

## 2020-09-13 PROCEDURE — 83690 ASSAY OF LIPASE: CPT

## 2020-09-13 PROCEDURE — 85025 COMPLETE CBC W/AUTO DIFF WBC: CPT

## 2020-09-13 PROCEDURE — 36000 PLACE NEEDLE IN VEIN: CPT

## 2020-09-13 PROCEDURE — 81003 URINALYSIS AUTO W/O SCOPE: CPT

## 2020-09-13 PROCEDURE — 80053 COMPREHEN METABOLIC PANEL: CPT

## 2020-09-13 PROCEDURE — 81025 URINE PREGNANCY TEST: CPT | Performed by: EMERGENCY MEDICINE

## 2020-09-13 PROCEDURE — 99284 EMERGENCY DEPT VISIT MOD MDM: CPT | Mod: 25

## 2020-09-13 PROCEDURE — U0003 INFECTIOUS AGENT DETECTION BY NUCLEIC ACID (DNA OR RNA); SEVERE ACUTE RESPIRATORY SYNDROME CORONAVIRUS 2 (SARS-COV-2) (CORONAVIRUS DISEASE [COVID-19]), AMPLIFIED PROBE TECHNIQUE, MAKING USE OF HIGH THROUGHPUT TECHNOLOGIES AS DESCRIBED BY CMS-2020-01-R: HCPCS

## 2020-09-13 RX ORDER — KETOROLAC TROMETHAMINE 30 MG/ML
15 INJECTION, SOLUTION INTRAMUSCULAR; INTRAVENOUS
Status: DISCONTINUED | OUTPATIENT
Start: 2020-09-13 | End: 2020-09-13 | Stop reason: HOSPADM

## 2020-09-13 NOTE — ED TRIAGE NOTES
Pt presented to ED after consulting nurse on call. Pt complains of L eye pain radiating to L temple region and jaw. Pt reports light sensitivity in both eyes. Redness present to L eye. Pt denies trauma to eye. Pt also complaining of R abdominal pain, reports hx of ovarian cysts on R ovary. Pt reports nausea, loss of appetite, and fatigue for past 3 days. Pt denies vomiting or loss of taste or smell. Pt reports occasional cough with scant mucus production. Pt denies shortness of breath or fever. Pt requesting COVID-19 screening.

## 2020-09-13 NOTE — ED NOTES
"Pt requesting to leave.  Concerned about not getting imaging done.  Explained the MD was waiting on blood work to determine which type of imaging to order.  Pt states that she will stay for "a little while longer."  "

## 2020-09-13 NOTE — ED PROVIDER NOTES
"Encounter Date: 9/13/2020    SCRIBE #1 NOTE: I, Justine Yu, am scribing for, and in the presence of, Dr. Bradley.       History     Chief Complaint   Patient presents with    Abdominal Pain     pt with abdominal pain and nausea , headaches and left eye pain and  jaw pain .      Time seen by provider: 9:49 AM    This is a 25 y.o. female who presents with complaint of right-sided headache that began when she woke up this morning around 7:30AM and noticed a red spot in her left eye. She also reports left eye pain which radiates across left side of her head to posterior head. She reports she has had similar headaches in the past, but that the red spot is what is different. Patient reports family history of stroke and glaucoma, and is concerned she may be having a stroke. She denies any vision changes, speech difficulty, numbness, tingling, or weakness.    Additionally, patient reports history of right ovarian cyst and endorses right lower abdominal pain. She describes the pain as a soreness and "pulling" sensation located at the site of her appendectomy surgical scar. She notes her ovarian cyst pain is usually worse than current pain. She also reports nausea and fatigue. Reports chest tightness that occurred briefly yesterday, denies any chest pain currently. Denies vaginal discharge, or dysuria. She is not on any birth control. She has an allergy to penicillin. She denies use of tobacco, alcohol or illicit drugs.    The history is provided by the patient.     Review of patient's allergies indicates:   Allergen Reactions    Penicillins Itching and Swelling     Right eye itching and swollen      No past medical history on file.  Past Surgical History:   Procedure Laterality Date    APPENDECTOMY       Family History   Problem Relation Age of Onset    Diabetes Mother     Uterine cancer Mother     Diabetes Father     Asthma Sister     Scoliosis Sister     Cancer Maternal Uncle     Breast cancer Neg Hx     " Colon cancer Neg Hx     Ovarian cancer Neg Hx      Social History     Tobacco Use    Smoking status: Never Smoker   Substance Use Topics    Alcohol use: Not Currently    Drug use: Never     Review of Systems   Constitutional: Positive for fatigue. Negative for chills and fever.   HENT: Negative for rhinorrhea, sore throat and trouble swallowing.    Eyes: Positive for pain. Negative for visual disturbance.   Respiratory: Negative for chest tightness, shortness of breath and wheezing.    Cardiovascular: Negative for chest pain, palpitations and leg swelling.   Gastrointestinal: Positive for abdominal pain and nausea. Negative for diarrhea and vomiting.   Genitourinary: Negative for dysuria, vaginal bleeding and vaginal discharge.   Neurological: Positive for headaches. Negative for facial asymmetry, speech difficulty, weakness, light-headedness and numbness.   All other systems reviewed and are negative.      Physical Exam     Initial Vitals [09/13/20 0922]   BP Pulse Resp Temp SpO2   117/64 85 18 98.9 °F (37.2 °C) 99 %      MAP       --         Physical Exam    Nursing note and vitals reviewed.  Constitutional: She appears well-developed and well-nourished. No distress.   HENT:   Head: Normocephalic and atraumatic.   Right Ear: External ear normal.   Left Ear: External ear normal.   Mouth/Throat: Oropharynx is clear and moist.   No sinus TTP  Bilateral TMs normal   Eyes: EOM are normal. Pupils are equal, round, and reactive to light. Right eye exhibits no discharge. Left eye exhibits no discharge. No scleral icterus.   Left eye medial small conjunctivae hemorrhage.   Neck: Normal range of motion. Neck supple. No JVD present.   No meningismus  No C-spine tenderness palpation percussion   Cardiovascular: Normal rate, regular rhythm, normal heart sounds and intact distal pulses. Exam reveals no gallop and no friction rub.    No murmur heard.  Pulmonary/Chest: Breath sounds normal. No respiratory distress. She has no  wheezes. She has no rhonchi. She has no rales. She exhibits no tenderness.   Abdominal: Soft. Bowel sounds are normal. She exhibits no distension and no mass. There is no abdominal tenderness. There is no rebound and no guarding.   Musculoskeletal: Normal range of motion. No tenderness or edema.   Neurological: She is alert and oriented to person, place, and time. She has normal strength and normal reflexes. She displays normal reflexes. No cranial nerve deficit or sensory deficit. She exhibits normal muscle tone. She displays a negative Romberg sign. GCS score is 15. GCS eye subscore is 4. GCS verbal subscore is 5. GCS motor subscore is 6.   Skin: Skin is warm and dry. No rash noted.   Psychiatric: She has a normal mood and affect. Thought content normal.         ED Course   Procedures  Labs Reviewed   CBC W/ AUTO DIFFERENTIAL - Abnormal; Notable for the following components:       Result Value    Mean Corpuscular Hemoglobin Conc 31.2 (*)     All other components within normal limits   COMPREHENSIVE METABOLIC PANEL - Abnormal; Notable for the following components:    Alkaline Phosphatase 49 (*)     All other components within normal limits   LIPASE   URINALYSIS, REFLEX TO URINE CULTURE    Narrative:     Specimen Source->Urine   SARS-COV-2 (COVID-19) QUALITATIVE PCR   POCT URINE PREGNANCY          Imaging Results          US Pelvis Comp with Transvag NON-OB (xpd) (Final result)  Result time 09/13/20 13:29:34   Procedure changed from US Pelvis Complete Non OB     Final result by Chris Bain MD (09/13/20 13:29:34)                 Impression:      No acute sonographic abnormality.    Right ovarian 2.2 cm simple appearing cyst.  Follow-up pelvic ultrasound in 2-3 months can be obtained to ensure stability/resolution.      Electronically signed by: Chris Bain MD  Date:    09/13/2020  Time:    13:29             Narrative:    EXAMINATION:  US PELVIS COMP WITH TRANSVAG NON-OB (XPD)    CLINICAL HISTORY:  right pelvic  pain;    TECHNIQUE:  Transabdominal sonography of the pelvis was performed, followed by transvaginal sonography to better evaluate the uterus and ovaries.    COMPARISON:  Pelvic ultrasound 08/05/2020    FINDINGS:  Uterus:    Measures 6.1 x 3.5 x 3.3 cm in dimensions, retroflexed.  No discrete uterine fibroids identified.  The endometrium is normal thickness measuring 6 mm.    Ovaries:    The ovaries are normal in size.  The right ovary measures 2.6 x 2.9 x 4.1 cm.  The left ovary measures 2.0 x 2.2 x 1.4 cm. There is a 2.2 cm well-circumscribed nonvascular anechoic structure within the right ovary suggestive of a small simple cyst.  Doppler flow demonstrated to both ovaries.    Free Fluid:    Trace volume in the cul-de-sac, nonspecific but commonly physiologic in this age group                                 Medical Decision Making:   History:   Old Medical Records: I decided to obtain old medical records.  Differential Diagnosis:   Migraine, tension headache, sinus headache, cluster headache, trigeminal neuralgia, ICH, TIA, seizure, meningitis, cerebral venous sinus thrombosis, spontaneous cerebrospinal fluid leak, Idiopathic intracranial hypertension, Giant cell arteritis, Arteriovenous malformation, Brain tumor, Chronic meningitis, Chronic subdural hematoma, Dissection of carotid or vertebral artery, Dural arteriovenous fistula, Leptomeningeal metastasis, Post-meningitis headache, Post-traumatic headache, Sphenoid sinusitis, Subarachnoid hemorrhage  Ectopic pregnancy, threatened miscarriage, miscarriage, urinary tract infection, acute pyelonephritis, ovarian torsion, ovarian cyst rupture, PID, BV, TOA, , ureterolithiais, AAA rupture / dissection, diverticulitis, colitis, inflammatory bowel disease, gastroenteritis, gastritis, ulcer, cholecystitis, gallstones, pancreatitis, ileus, small bowel obstruction, appendicitis, constipation, intestinal gas pain, GERD, intestinal spasm,  intrabominal hemorrhage,  intrabominal thrombus      Clinical Tests:   Lab Tests: Ordered and Reviewed  ED Management:  Patient requesting COVID test as she was told by the nurse on the nurse care line that she might have COVID causing her symptoms.  Discussed with patient that I did not believe her signs and symptoms were consistent with COVID infection, but given prevalence in the community, will obtain a routine COVID test.  Patient concerned that her subconjunctival hemorrhage was a sign of a silent stroke which she says that her family has a history of.  Discussed with patient that she has no risk factors for stroke and a completely normal neurologic exam, so despite her headache, I am not concerned that she is currently having a stroke as she stated that this headache is consistent with previous headaches.  Also explained to the patient that some conjunctiva hemorrhages not a sign or symptom of stroke either.  Lastly I discussed with patient that since she has this right ovarian pain with every menses, she should consider following up with OBGYN to discuss possibly initiating OCPs.  Patient stated that she was not interested in hormones or any other possible treatment at this time.  She initially declined medications I would prescribed, and after discussing it further, she still declined.  After taking into careful account the patient's historical factors, physical exam findings, empirical and objective data obtained from ED workup, I feel it is safe and appropriate at this time for the patient to be discharged for follow up and re-evaluation as detailed in the discharge instructions. The patient improved with treatment in the ED and the patient/guardian is comfortable going home. I have discussed the specifics of the workup with the patient/guardian and the patient/guardian has verbalized understanding of the details of the workup, the diagnosis, the treatment plan, and the need for outpatient follow-up.  Although the patient has no  emergent etiology today this does not preclude the development of an emergent condition after discharge.  I educated the patient/guardian on the warning signs and symptoms for which they must seek immediate medical attention. I have advised the patient/guardian that they can return to the ED and/or activate EMS at any time with worsening of their symptoms, change of their symptoms, or with any other medical complaints.  Patient's/guardian understands the ED visit today was primarily to address immediate concerns and to rule out emergent causes of the symptoms. They also understand that these symptoms may require further workup and evaluation as an outpatient. I emphasized the importance of followup.  All questions addressed and patient/guardian were given discharge instructions and followup information.                 Scribe Attestation:   Scribe #1: I performed the above scribed service and the documentation accurately describes the services I performed. I attest to the accuracy of the note.    Attending Attestation:           Physician Attestation for Scribe:  Physician Attestation Statement for Scribe #1: I, Dr. Bradley, reviewed documentation, as scribed by Justine Yu in my presence, and it is both accurate and complete.                 ED Course as of Sep 13 1553   Sun Sep 13, 2020   1113 Patient informed the nurse that she wants to leave against medical advise, initially patient did not want to stay, but once explained our differential and process, she agreed to stay    [MA]      ED Course User Index  [MA] Nigel Bradley MD            Clinical Impression:     1. Acute nonintractable headache, unspecified headache type    2. Subconjunctival hemorrhage of left eye    3. Right lower quadrant abdominal pain              ED Disposition Condition    Discharge Stable        ED Prescriptions     None        Follow-up Information     Follow up With Specialties Details Why Contact Info Additional Information     [facility] Internal Med-[place] [address] Internal Medicine Schedule an appointment as soon as possible for a visit  For follow-up and re-evaluation OF YOUR SYMPTOMS [address]  [facility] [id number] [phone] Internal Medicine - [facility], 8th Floor, [address] Please park in [place] and use [place] elevators    [facility] [place] Emergency Medicine  As needed, for any new or worsening symptoms [address]  [facility] [id number] [phone]     [facility] [address] Obstetrics and Gynecology Schedule an appointment as soon as possible for a visit in 3 days For follow-up and re-evaluation OF RIGHT LOWER QUADRANT ABDOMINAL PAIN [address], [facility] [address]  [facility] [id number] [phone] [facility] - [facility], 1st Floor Please lesley in Neelam Bradley MD  09/13/20 1754

## 2020-09-13 NOTE — TELEPHONE ENCOUNTER
"    Reason for Disposition   [1] Eye pain AND [2] more than mild   Fever > 103 F (39.4 C)    Additional Information   Negative: Chemical got in the eye   Negative: Piece of something got in the eye   Negative: Eye redness followed an eye injury   Negative: Yellow or green pus in the eyes   Negative: [1] Eyelid is swollen AND [2] no redness of white of eye (sclera)   Negative: Caused by pollen or other allergy OR the main symptom is itchy eyes   Negative: Red, widespread rash also present   Negative: Severe eye pain   Negative: [1] Eyelids are very swollen (shut or almost) AND [2] fever   Negative: [1] Eyelid (outer) is very red (or tender to touch) AND [2] fever   Negative: [1] Foreign body sensation ("feels like something is in there") AND [2] irrigation didn't help   Negative: [1] Recent eye surgery AND [2] increasing eye pain   Negative: Vomiting   Negative: Patient sounds very sick or weak to the triager   Negative: Blurred vision   Negative: Shock suspected (e.g., cold/pale/clammy skin, too weak to stand, low BP, rapid pulse)   Negative: Difficult to awaken or acting confused (e.g., disoriented, slurred speech)   Negative: Passed out (i.e., lost consciousness, collapsed and was not responding)   Negative: Sounds like a life-threatening emergency to the triager   Negative: [1] SEVERE pain (e.g., excruciating) AND [2] present > 1 hour   Negative: [1] SEVERE pain AND [2] age > 60   Negative: [1] Vomiting AND [2] contains red blood or black ("coffee ground") material  (Exception: few red streaks in vomit that only happened once)   Negative: Blood in bowel movements   (Exception: blood on surface of BM with constipation)   Negative: Black or tarry bowel movements  (Exception: chronic-unchanged  black-grey bowel movements AND is taking iron pills or Pepto-bismol)   Negative: Patient sounds very sick or weak to the triager   Negative: [1] MILD-MODERATE pain AND [2] constant AND [3] present " "> 2 hours   Negative: White of the eyes have turned yellow (i.e., jaundice)   Negative: [1] Vomiting AND [2] abdomen looks much more swollen than usual   Negative: [1] Vomiting AND [2] contains bile (green color)    Protocols used: ST EYE - RED WITHOUT PUS-A-, ABDOMINAL PAIN - FEMALE-A-AH    Patient woke up this morning to find red "bloodshot" in her left eye, but she doesn't recall injury to the eye. Below the left lower eyelid she has moderate swelling (based on description) but no drainage from the eye. She has a family history of glaucoma and feels pain behind the left eye. No pain or swelling in the right eye but there is a yellow ring around her right pupil.  She feels tenderness in front of the left ear tragus. No known exposure to coronavirus 19. She has history of appendectomy and at the surgical site( from 10 years ago) it is hurting; no changes to her healed incision from the appendectomy. Patient informed that all of these symptoms could be associated with covid 19 (muscle pain -back, red eyes, abdominal pain, nausea, cough, runny nose) so she should be tested today. Explained that a rapid test is done in the ED but unsure of the processing time for the urgent care center so she would have to inquire. By the end of the triage call, patient decided to go to the ED.   "

## 2020-09-14 LAB — SARS-COV-2 RNA RESP QL NAA+PROBE: NOT DETECTED
